# Patient Record
Sex: MALE | Race: WHITE | NOT HISPANIC OR LATINO | URBAN - NONMETROPOLITAN AREA
[De-identification: names, ages, dates, MRNs, and addresses within clinical notes are randomized per-mention and may not be internally consistent; named-entity substitution may affect disease eponyms.]

---

## 2020-12-04 ENCOUNTER — IMPORTED ENCOUNTER (OUTPATIENT)
Dept: URBAN - NONMETROPOLITAN AREA CLINIC 1 | Facility: CLINIC | Age: 30
End: 2020-12-04

## 2020-12-04 PROBLEM — H52.03: Noted: 2020-12-04

## 2020-12-04 PROBLEM — H52.223: Noted: 2020-12-04

## 2020-12-04 PROCEDURE — 92340 FIT SPECTACLES MONOFOCAL: CPT

## 2020-12-04 PROCEDURE — S0620 ROUTINE OPHTHALMOLOGICAL EXA: HCPCS

## 2020-12-04 NOTE — PATIENT DISCUSSION
Compound Hyperopic Astigmatism OU -  discussed findings w/patient-  new spectacle Rx issued for FT wear-  continue to monitor yearly or prn; 's Notes: MR 12/4/2020DFE 12/4/2020

## 2022-04-09 ASSESSMENT — VISUAL ACUITY
OS_CC: 20/30-
OD_PH: 20/40-
OD_CC: 20/100+
OU_SC: 20/25

## 2022-04-09 ASSESSMENT — TONOMETRY
OD_IOP_MMHG: 18
OS_IOP_MMHG: 18

## 2023-05-05 ENCOUNTER — NEW PATIENT (OUTPATIENT)
Dept: RURAL CLINIC 1 | Facility: CLINIC | Age: 33
End: 2023-05-05

## 2023-05-05 DIAGNOSIS — H04.123: ICD-10-CM

## 2023-05-05 DIAGNOSIS — H52.03: ICD-10-CM

## 2023-05-05 DIAGNOSIS — H52.223: ICD-10-CM

## 2023-05-05 PROCEDURE — S0620 ROUTINE OPHTHALMOLOGICAL EXA: HCPCS

## 2023-05-05 ASSESSMENT — VISUAL ACUITY
OD_PH: 20/40-1
OS_SC: 20/25
OD_SC: 20/100
OS_SC: 20/25-1
OU_SC: 20/30
OU_SC: 20/25
OD_SC: 20/100

## 2023-05-05 ASSESSMENT — TONOMETRY
OS_IOP_MMHG: 16
OD_IOP_MMHG: 17